# Patient Record
Sex: MALE
[De-identification: names, ages, dates, MRNs, and addresses within clinical notes are randomized per-mention and may not be internally consistent; named-entity substitution may affect disease eponyms.]

---

## 2017-10-22 ENCOUNTER — HOSPITAL ENCOUNTER (INPATIENT)
Dept: HOSPITAL 92 - ERS | Age: 42
LOS: 5 days | Discharge: HOME | DRG: 580 | End: 2017-10-27
Attending: FAMILY MEDICINE | Admitting: FAMILY MEDICINE
Payer: SELF-PAY

## 2017-10-22 VITALS — BODY MASS INDEX: 37 KG/M2

## 2017-10-22 DIAGNOSIS — E66.9: ICD-10-CM

## 2017-10-22 DIAGNOSIS — L02.415: ICD-10-CM

## 2017-10-22 DIAGNOSIS — B95.61: ICD-10-CM

## 2017-10-22 DIAGNOSIS — E87.1: ICD-10-CM

## 2017-10-22 DIAGNOSIS — E11.65: ICD-10-CM

## 2017-10-22 DIAGNOSIS — M70.41: ICD-10-CM

## 2017-10-22 DIAGNOSIS — L03.115: Primary | ICD-10-CM

## 2017-10-22 DIAGNOSIS — R03.0: ICD-10-CM

## 2017-10-22 LAB
ALP SERPL-CCNC: 118 U/L (ref 40–150)
ALT SERPL W P-5'-P-CCNC: 58 U/L (ref 8–55)
ANION GAP SERPL CALC-SCNC: 10 MMOL/L (ref -14–95)
ANION GAP SERPL CALC-SCNC: 14 MMOL/L (ref 10–20)
AST SERPL-CCNC: 32 U/L (ref 5–34)
BASOPHILS # BLD AUTO: 0.1 THOU/UL (ref 0–0.2)
BASOPHILS NFR BLD AUTO: 0.5 % (ref 0–1)
BILIRUB SERPL-MCNC: 0.6 MG/DL (ref 0.2–1.2)
BUN SERPL-MCNC: 12 MG/DL (ref 8.9–20.6)
CALCIUM SERPL-MCNC: 9.5 MG/DL (ref 7.8–10.44)
CHLORIDE SERPL-SCNC: 101 MMOL/L (ref 98–113)
CHLORIDE SERPL-SCNC: 95 MMOL/L (ref 98–107)
CK SERPL-CCNC: 79 U/L (ref 30–200)
CO2 BLDV CALC-SCNC: 25.7 MMOL/L (ref 1–85)
CO2 SERPL-SCNC: 26 MMOL/L (ref 22–29)
CREAT CL PREDICTED SERPL C-G-VRATE: 0 ML/MIN (ref 70–130)
EOSINOPHIL # BLD AUTO: 0.3 THOU/UL (ref 0–0.7)
EOSINOPHIL NFR BLD AUTO: 2.2 % (ref 0–10)
GLOBULIN SER CALC-MCNC: 3.8 G/DL (ref 2.4–3.5)
HCT VFR BLD CALC: 47 % (ref 42–52)
LACTATE SERPL-SCNC: 2.2 MMOL/L (ref 0.5–2.2)
LYMPHOCYTES # BLD: 3.5 THOU/UL (ref 1.2–3.4)
LYMPHOCYTES NFR BLD AUTO: 31 % (ref 21–51)
MONOCYTES # BLD AUTO: 0.8 THOU/UL (ref 0.11–0.59)
MONOCYTES NFR BLD AUTO: 7.5 % (ref 0–10)
NEUTROPHILS # BLD AUTO: 6.7 THOU/UL (ref 1.4–6.5)
RBC # BLD AUTO: 4.74 MILL/UL (ref 4.7–6.1)
SAO2 % BLDV FROM PO2: 97.8 % (ref 0–100)
WBC # BLD AUTO: 11.3 THOU/UL (ref 4.8–10.8)

## 2017-10-22 PROCEDURE — 96372 THER/PROPH/DIAG INJ SC/IM: CPT

## 2017-10-22 PROCEDURE — 80202 ASSAY OF VANCOMYCIN: CPT

## 2017-10-22 PROCEDURE — 86140 C-REACTIVE PROTEIN: CPT

## 2017-10-22 PROCEDURE — 87186 SC STD MICRODIL/AGAR DIL: CPT

## 2017-10-22 PROCEDURE — 87070 CULTURE OTHR SPECIMN AEROBIC: CPT

## 2017-10-22 PROCEDURE — 82550 ASSAY OF CK (CPK): CPT

## 2017-10-22 PROCEDURE — 96375 TX/PRO/DX INJ NEW DRUG ADDON: CPT

## 2017-10-22 PROCEDURE — 36416 COLLJ CAPILLARY BLOOD SPEC: CPT

## 2017-10-22 PROCEDURE — 83930 ASSAY OF BLOOD OSMOLALITY: CPT

## 2017-10-22 PROCEDURE — 82010 KETONE BODYS QUAN: CPT

## 2017-10-22 PROCEDURE — 87205 SMEAR GRAM STAIN: CPT

## 2017-10-22 PROCEDURE — 85007 BL SMEAR W/DIFF WBC COUNT: CPT

## 2017-10-22 PROCEDURE — 85025 COMPLETE CBC W/AUTO DIFF WBC: CPT

## 2017-10-22 PROCEDURE — 83036 HEMOGLOBIN GLYCOSYLATED A1C: CPT

## 2017-10-22 PROCEDURE — 96367 TX/PROPH/DG ADDL SEQ IV INF: CPT

## 2017-10-22 PROCEDURE — 85027 COMPLETE CBC AUTOMATED: CPT

## 2017-10-22 PROCEDURE — 96365 THER/PROPH/DIAG IV INF INIT: CPT

## 2017-10-22 PROCEDURE — 87077 CULTURE AEROBIC IDENTIFY: CPT

## 2017-10-22 PROCEDURE — 83605 ASSAY OF LACTIC ACID: CPT

## 2017-10-22 PROCEDURE — 80053 COMPREHEN METABOLIC PANEL: CPT

## 2017-10-22 PROCEDURE — 82330 ASSAY OF CALCIUM: CPT

## 2017-10-22 PROCEDURE — 85652 RBC SED RATE AUTOMATED: CPT

## 2017-10-22 PROCEDURE — 36415 COLL VENOUS BLD VENIPUNCTURE: CPT

## 2017-10-22 PROCEDURE — 82947 ASSAY GLUCOSE BLOOD QUANT: CPT

## 2017-10-22 PROCEDURE — 87040 BLOOD CULTURE FOR BACTERIA: CPT

## 2017-10-22 PROCEDURE — 82803 BLOOD GASES ANY COMBINATION: CPT

## 2017-10-22 PROCEDURE — 80048 BASIC METABOLIC PNL TOTAL CA: CPT

## 2017-10-22 NOTE — RAD
RIGHT KNEE FOUR VIEWS:

 

History: Knee pain. 

 

FINDINGS: 

There is no sign of fracture, dislocation, or joint effusion. 

 

IMPRESSION: 

Negative right knee. 

 

POS: FAUSTINO

## 2017-10-23 LAB
ANION GAP SERPL CALC-SCNC: 8 MMOL/L (ref 10–20)
BUN SERPL-MCNC: 9 MG/DL (ref 8.9–20.6)
CALCIUM SERPL-MCNC: 8.4 MG/DL (ref 7.8–10.44)
CHLORIDE SERPL-SCNC: 105 MMOL/L (ref 98–107)
CO2 SERPL-SCNC: 27 MMOL/L (ref 22–29)
CREAT CL PREDICTED SERPL C-G-VRATE: 185 ML/MIN (ref 70–130)
HCT VFR BLD CALC: 40.8 % (ref 42–52)
RBC # BLD AUTO: 4.1 MILL/UL (ref 4.7–6.1)
WBC # BLD AUTO: 7.2 THOU/UL (ref 4.8–10.8)

## 2017-10-23 RX ADMIN — INSULIN LISPRO PRN UNIT: 100 INJECTION, SOLUTION INTRAVENOUS; SUBCUTANEOUS at 12:04

## 2017-10-23 RX ADMIN — INSULIN LISPRO PRN UNIT: 100 INJECTION, SOLUTION INTRAVENOUS; SUBCUTANEOUS at 17:56

## 2017-10-23 RX ADMIN — INSULIN LISPRO PRN UNIT: 100 INJECTION, SOLUTION INTRAVENOUS; SUBCUTANEOUS at 05:16

## 2017-10-23 RX ADMIN — CEFTRIAXONE SCH MLS: 2 INJECTION, POWDER, FOR SOLUTION INTRAMUSCULAR; INTRAVENOUS at 21:37

## 2017-10-23 RX ADMIN — VANCOMYCIN HYDROCHLORIDE SCH MLS: 1 INJECTION, POWDER, LYOPHILIZED, FOR SOLUTION INTRAVENOUS at 21:45

## 2017-10-23 RX ADMIN — VANCOMYCIN HYDROCHLORIDE SCH MLS: 1 INJECTION, POWDER, LYOPHILIZED, FOR SOLUTION INTRAVENOUS at 10:07

## 2017-10-23 NOTE — HP
DATE OF ADMISSION:  10/22/2017

 

PRIMARY CARE PHYSICIAN:  City call.

 

CHIEF COMPLAINT:  Right knee pain.

 

HISTORY OF PRESENT ILLNESS:  This is a 42-year-old  male who presents to [facility] complaining of 4 to 5-day history of increasing right knee pain, swelling, redness wi
th difficulty moving the knee.  The patient states he was changing a tire approximately 4 to 5 days 
prior to this evaluation, kneeling on his right knee when a thorn punctured over his right kneecap. 
 The patient states he had increasing swelling, pain, and redness moving up his right inner thigh in
 the last 24 to 48 hours.  The patient states he clean the area with soap and water; however, did no
t notice any foreign body or drainage from the knee.  The patient denies any prior similar incidents
 in the past.  The patient did not take any other specific medications and denied documented or subj
ective fever.  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  In the emergency room, the patient underwent general evaluation with incr
eased redness of the right knee noted undergoing plain radiographic evaluation showing no evidence o
f foreign body.  Screening metabolic survey was also performed showing evidence of undiagnosed diabe
anny mellitus with a glucose of 614.  The patient received intravenous fluids as well as 10 units of 
Novolin R and Zofran.  The patient also received IV vancomycin and Rocephin after suspicion for loca
l cellulitis versus septic bursitis.  The patient was transferred to the Hospitalist Service for winston
luation and admission.

 

PAST MEDICAL HISTORY:  Diabetes mellitus type 2, untreated.

 

PAST SURGICAL HISTORY:  Reviewed and negative.

 

CURRENT MEDICATIONS:  Reviewed and negative.

 

ALLERGIES:  No known drug allergies.

 

FAMILY HISTORY:  The patient admits to one uncle with diabetes mellitus.

 

SOCIAL HISTORY:  The patient has resides in the [facility] area.  Works as a tractor [employer].  Positive alcohol use.  No tobacco or illicit drug use.

 

REVIEW OF SYSTEMS:  The following complete review of systems was negative, unless otherwise mentione
d in the HPI or below:

Constitutional:  Weight loss or gain, ability to conduct usual activities.

Skin:  Rash, itching.

Eyes:  Double vision, pain.

ENT/Mouth:  Nose bleeding, neck stiffness, pain, tenderness.

Cardiovascular:  Palpitations, dyspnea on exertion, orthopnea.

Respiratory:  Shortness of breath, wheezing, cough, hemoptysis, fever or night sweats.

Gastrointestinal:  Poor appetite, abdominal pain, heartburn, nausea, vomiting, constipation, or diar
stella.

Genitourinary:  Urgency, frequency, dysuria, nocturia.

Musculoskeletal:  Pain, swelling.

Neurologic/Psychiatric:  Anxiety, depression.

Allergy/Immunologic:  Skin rash, bleeding tendency.

 

Otherwise, negative except as stated per HPI.

 

PHYSICAL EXAMINATION:

VITAL SIGNS:  On admission, blood pressure 155/95, pulse 94, respiratory rate 18, temperature 98.3 d
egrees Fahrenheit, O2 saturation 98% on room air.

GENERAL APPEARANCE:  This is a 42-year-old  male, alert and oriented x3, pleasant, in no acu
te distress.

HEENT:  Pupils are equal, round, and reactive to light and accommodation.  Extraocular muscles are i
ntact.  No scleral icterus, no conjunctival injection.  Nares patent.  OP is clear.  Teeth in fair r
epair.

NECK:  Supple, no cervical adenopathy, no thyromegaly, no carotid bruits, no JVD appreciated.  Cervi
major spine with full active and passive range of motion.

CHEST:  Lungs are clear to auscultation bilaterally.

CARDIOVASCULAR:  S1, S2, without noted murmur.

ABDOMEN:  Obese, soft, nontender, nondistended.  Bowel sounds are positive in all four quadrants.  T
here is no hepatosplenomegaly, no abdominal bruits, no rebound or guarding appreciated.

EXTREMITIES:  Warm and dry with fair turgor.  Positive edema and erythema over the right patella wit
h partial fluctuance to palpation.  No expressible discharge noted.  Limited active range of motion 
and terminal degrees of flexion.  No popliteal space edema or tenderness to palpation.  Pulses are p
alpable distally at the dorsalis pedis, posterior tibial, and popliteal arteries bilaterally.  Capil
leatha refill less than 2 seconds.

NEUROLOGIC:  Cranial nerves II-XII are grossly intact.  No focal or lateralizing signs appreciated.

 

PERTINENT LABORATORY DATA AND X-RAY FINDINGS:  Sodium 131, potassium 4.2, chloride 95, CO2 of 26, BU
N 12, creatinine 1.03 with estimated GFR of 80, glucose 614, lactic acid level 2.2, serum osmolality
 302, calcium 9.5, total bilirubin 0.6, AST 32, ALT of 58, alkaline phosphatase 118.  CRP 5.61.  Tot
al CK of 79, albumin 4.1.  CBC showed a white blood cell count of 11.3, hemoglobin 15.8, hematocrit 
47, MCV 99.1, platelet count 205 with normal differential.  ESR 14.  Beta hydroxybutyrate level 0.40
.  Four views of the right knee dated 10/22/2017 showed no evidence of acute process.

 

ASSESSMENT AND PLAN:

1.  Right knee cellulitis/questionable septic bursitis.  The patient will be admitted to the medical
 floor.  We will continue Rocephin 2 grams IV q.24 hours with additional vancomycin 1 gram IV q.12 h
ours. We will consult Orthopedic Surgery Service for evaluation and consideration for incision and d
rainage.  Repeat CBC in the a.m.  Continue pain control with Toradol 30 mg IV q.6 hours.

2.  New onset diabetes mellitus type 2, uncontrolled.  We will initiate insulin sliding scale with H
umalog.  Initiate metformin 850 mg p.o. b.i.d.  Check A1c level in the a.m.  ADA diet.  Serial Accu-
Cheks before meals and at bedtime.

3.  Hyponatremia secondary to hyperglycemia.  We will continue normal saline at 125 mL per hour and 
repeat sodium level in the a.m.

4.  Elevated blood pressure.  No specific diagnosis of prior hypertension.  We will continue serial 
blood pressure monitoring and treat as clinically indicated.

5.  Leukocytosis secondary to #1.  Repeat CBC in the a.m.

6.  Prophylaxis.  Update tetanus prior to discharge.  Pepcid 20 mg p.o. b.i.d.  Sequential compressi
on devices while in bed.

7.  Code status is FULL.  Surrogate medical decision maker is patient's son.

## 2017-10-23 NOTE — CON
DATE OF CONSULTATION:  10/23/2017

 

ORTHOPEDIC CONSULTATION

 

REQUESTING PHYSICIAN:  Jaz Beltran M.D.

 

CONSULTING PHYSICIAN:  Samir Arnold M.D.

 

REASON FOR CONSULTATION:  Right knee pain, swelling, and redness consistent with suprapatellar bursi
tis and septic bursitis.

 

BRIEF CLINICAL HISTORY:  Jake is a 42-year-old  male who was admitted to the emergency ro
om by the medicine team after he presented with increasing pain in the right knee for 4 to 5 days.  
He had no evaluation and no treatment, but apparently a thorn punctured his skin over his right knee
cap.  Pain has been getting worse, but he has had no drainage and the erythema has been becoming mor
e noticeable.  Apparently, he presented with glucose of 614.  He has been started on IV vancomycin a
nd Rocephin.  Our service was consulted for evaluation for septic bursitis and surgical candidacy fo
r possible drainage.

 

PAST MEDICAL HISTORY:  New diagnosis, diabetes type 2, untreated.

 

PAST SURGICAL HISTORY:  Negative.

 

MEDICATIONS:  None.

 

ALLERGIES:  No known drug allergies.  Denies any contact allergies.

 

SOCIAL HISTORY:  He is a labor, in particular a .  Consumes ethanol socially.  No to
bacco or illicit drug abuse.

 

PHYSICAL EXAMINATION:  Visual inspection of the right lower extremity demonstrates him to have swell
ing in the supralateral aspect of the right knee.  Patellar ballottement is negative.  There is no f
luctuance, but it is quite indurated, tender, warm, and erythematous, but the joint does not appear 
to be involved.  He has active range of motion of the knee and passive is non-provocative.  No evide
nce of trauma was noted, but he does have some induration of about 3 to 4 cm in length in the transv
erse plane.  No fluctuance is noted.  No purulence is expressed.  Erythema surrounds this and is ten
job and warm and local and blanches with pressure.

 

IMPRESSION:  Right knee extracapsular suprapatellar cellulitis.

 

PLAN:  We will observe for the next 24 hours.  We will let him receive IV antibiotics to see if the 
swelling and induration goes down, but currently I cannot detect a fluctuant abscess appropriate for
 drainage.  The knee capsule does not appear to be involved, but certainly proceed with IV antibioti
c treatment.  Reevaluate tomorrow morning and I will keep him n.p.o. just in case.

## 2017-10-23 NOTE — PDOC.PN
- Subjective


Encounter Start Date: 10/23/17


Encounter Start Time: 12:00


Subjective: right knee is feeling better





- Objective


Resuscitation Status: 


 











Resuscitation Status           FULL:Full Resuscitation














MAR Reviewed: Yes


Vital Signs & Weight: 


 Vital Signs (12 hours)











  Temp Pulse Resp BP BP Pulse Ox


 


 10/23/17 12:00  97.6 F  75  18  123/77   95


 


 10/23/17 08:00  97.9 F  74  18  111/73   98


 


 10/23/17 04:00  97.9 F  77  16   119/80  98








 Weight











Weight                         223 lb














I&O: 


 











 10/22/17 10/23/17 10/24/17





 06:59 06:59 06:59


 


Intake Total  1275 240


 


Balance  1275 240











Result Diagrams: 


 10/23/17 05:09





 10/23/17 05:09


Additional Labs: 


 Accuchecks











  10/23/17 10/23/17 10/22/17





  11:57 04:57 23:17


 


POC Glucose  214 H  284 H  277 H














Phys Exam





- Physical Examination


HEENT: PERRLA, moist MMs


Neck: no JVD, supple


Respiratory: no wheezing, no rales


Cardiovascular: RRR, no significant murmur


Gastrointestinal: soft, no distention, positive bowel sounds


Musculoskeletal: pulses present


right knee has edema, erythema and crepitus


Neurological: non-focal, moves all 4 limbs


Psychiatric: A&O x 3





Dx/Plan


(1) Cellulitis of right knee


Code(s): L03.115 - CELLULITIS OF RIGHT LOWER LIMB   Status: Acute   





(2) DM type 2 (diabetes mellitus, type 2)


Status: Acute   


Qualifiers: 


   Diabetes mellitus complication status: with hyperglycemia   Diabetes 

mellitus long term insulin use: without long term use   Qualified Code(s): 

E11.65 - Type 2 diabetes mellitus with hyperglycemia   


Comment: new onset   





(3) Obesity (BMI 30-39.9)


Code(s): E66.9 - OBESITY, UNSPECIFIED   Status: Chronic   





- Plan


is on vanc and ceftriaxone


-: diabetic education, new onset dm


-: is on metformin 850mg bid with coverage


-:  will revisit patient in am to see response for antibiotics





* .








Review of Systems





- Medications/Allergies


Allergies/Adverse Reactions: 


 Allergies











Allergy/AdvReac Type Severity Reaction Status Date / Time


 


No Known Drug Allergies Allergy   Verified 10/23/17 00:39











Medications: 


 Current Medications





Acetaminophen (Tylenol)  1,000 mg PO Q6H PRN


   PRN Reason: Headache/Fever or Mild Pain


Hydrocodone Bitart/Acetaminophen (Norco 5/325)  1 tab PO Q4H PRN


   PRN Reason: Moderate Pain (4-6)


Clonidine HCl (Catapres)  0.1 mg PO Q4H PRN


   PRN Reason: Systolic BP > 180


Dextrose/Water (Dextrose 50%)  25 gm SLOW IVP PRN PRN


   PRN Reason: Hypoglycemia


Famotidine (Pepcid)  20 mg PO BID [facility]


   Last Admin: 10/23/17 08:07 Dose:  20 mg


Glucagon (Glucagon)  1 mg IM PRN PRN


   PRN Reason: Hypoglycemia


Hydralazine HCl (Apresoline)  10 mg SLOW IVP Q4H PRN


   PRN Reason: Systolic BP > 180


Dextrose/Water (D5w)  1,000 mls @ 0 mls/hr IV .Q0M PRN; As Directed


   PRN Reason: Hypoglycemia


Sodium Chloride (Normal Saline 0.9%)  1,000 mls @ 125 mls/hr IV .Q8H [facility]


   Last Admin: 10/23/17 08:10 Dose:  1,000 mls


Vancomycin HCl 1.5 gm/ Sodium (Chloride)  250 mls @ 166.67 mls/hr IVPB 1000,

2200 [facility]


   Last Admin: 10/23/17 10:07 Dose:  250 mls


Ceftriaxone Sodium 2 gm/Miscellaneous Medication 1 each/ Sodium Chloride  100 

mls @ 200 mls/hr IVPB Q24HR [facility]


Insulin Human Lispro (Humalog)  0 units SC .MODERATE SLIDING SC PRN


   PRN Reason: Moderate Correctional Scale


   Last Admin: 10/23/17 12:04 Dose:  4 unit


Insulin Human Lispro (Humalog)  0 units SC .BEDTIME SLIDING SC PRN


   PRN Reason: Bedtime Correctional Scale


Ketorolac Tromethamine (Toradol)  30 mg IVP Q6HR [facility]


   Stop: 10/27/17 23:59


   Last Admin: 10/23/17 12:03 Dose:  30 mg


Metformin HCl (Glucophage)  850 mg PO BID-[facility]


   Last Admin: 10/23/17 08:07 Dose:  850 mg


Ondansetron HCl (Zofran Odt)  4 mg PO Q6H PRN


   PRN Reason: Nausea/Vomiting


Ondansetron HCl (Zofran)  4 mg IVP Q6H PRN


   PRN Reason: Nausea/Vomiting

## 2017-10-24 LAB — VANCOMYCIN TROUGH SERPL-MCNC: 5.7 UG/ML

## 2017-10-24 RX ADMIN — CEFTRIAXONE SCH MLS: 2 INJECTION, POWDER, FOR SOLUTION INTRAMUSCULAR; INTRAVENOUS at 21:04

## 2017-10-24 RX ADMIN — INSULIN LISPRO PRN UNIT: 100 INJECTION, SOLUTION INTRAVENOUS; SUBCUTANEOUS at 12:19

## 2017-10-24 RX ADMIN — VANCOMYCIN HYDROCHLORIDE SCH MLS: 1 INJECTION, POWDER, LYOPHILIZED, FOR SOLUTION INTRAVENOUS at 21:41

## 2017-10-24 RX ADMIN — INSULIN LISPRO PRN UNIT: 100 INJECTION, SOLUTION INTRAVENOUS; SUBCUTANEOUS at 17:05

## 2017-10-24 RX ADMIN — VANCOMYCIN HYDROCHLORIDE SCH MLS: 1 INJECTION, POWDER, LYOPHILIZED, FOR SOLUTION INTRAVENOUS at 09:08

## 2017-10-24 RX ADMIN — HYDROCODONE BITARTRATE AND ACETAMINOPHEN PRN TAB: 5; 325 TABLET ORAL at 16:44

## 2017-10-24 RX ADMIN — HYDROCODONE BITARTRATE AND ACETAMINOPHEN PRN TAB: 5; 325 TABLET ORAL at 09:07

## 2017-10-24 NOTE — PDOC.PN
- Subjective


Encounter Start Date: 10/24/17


Encounter Start Time: 11:00


Subjective: feels better, right knee pain and swelling is better





- Objective


Resuscitation Status: 


 











Resuscitation Status           FULL:Full Resuscitation














MAR Reviewed: Yes


Vital Signs & Weight: 


 Vital Signs (12 hours)











  Temp Pulse Resp BP Pulse Ox


 


 10/24/17 07:54  98.1 F  72  16  129/86  97








 Weight











Weight                         223 lb














I&O: 


 











 10/23/17 10/24/17 10/25/17





 06:59 06:59 06:59


 


Intake Total 1275 480 


 


Balance 1275 480 











Result Diagrams: 


 10/23/17 05:09





 10/23/17 05:09


Additional Labs: 


 Accuchecks











  10/24/17 10/23/17 10/23/17





  04:13 20:23 16:12


 


POC Glucose  246 H  267 H  280 H














  10/23/17





  11:57


 


POC Glucose  214 H














Phys Exam





- Physical Examination


HEENT: PERRLA, moist MMs


Neck: no JVD, supple


Respiratory: no wheezing, no rales


Cardiovascular: RRR, no significant murmur


Gastrointestinal: soft, non-tender, positive bowel sounds


Musculoskeletal: pulses present


right knee erythema and induration is better


Neurological: non-focal, moves all 4 limbs


Psychiatric: A&O x 3





Dx/Plan


(1) Cellulitis of right knee


Code(s): L03.115 - CELLULITIS OF RIGHT LOWER LIMB   Status: Acute   





(2) DM type 2 (diabetes mellitus, type 2)


Status: Acute   


Qualifiers: 


   Diabetes mellitus complication status: with hyperglycemia   Diabetes 

mellitus long term insulin use: without long term use   Qualified Code(s): 

E11.65 - Type 2 diabetes mellitus with hyperglycemia   


Comment: new onset   





(3) Obesity (BMI 30-39.9)


Code(s): E66.9 - OBESITY, UNSPECIFIED   Status: Chronic   





- Plan


is on vanc and ceftriaxone


-: add glipizide


-: will switch metformin to tid 500mg in am


-: ortho team is following





* .








Review of Systems





- Medications/Allergies


Allergies/Adverse Reactions: 


 Allergies











Allergy/AdvReac Type Severity Reaction Status Date / Time


 


No Known Drug Allergies Allergy   Verified 10/23/17 00:39











Medications: 


 Current Medications





Acetaminophen (Tylenol)  1,000 mg PO Q6H PRN


   PRN Reason: Headache/Fever or Mild Pain


Hydrocodone Bitart/Acetaminophen (Norco 5/325)  1 tab PO Q4H PRN


   PRN Reason: Moderate Pain (4-6)


   Last Admin: 10/24/17 09:07 Dose:  1 tab


Clonidine HCl (Catapres)  0.1 mg PO Q4H PRN


   PRN Reason: Systolic BP > 180


Dextrose/Water (Dextrose 50%)  25 gm SLOW IVP PRN PRN


   PRN Reason: Hypoglycemia


Famotidine (Pepcid)  20 mg PO BID [facility]


   Last Admin: 10/24/17 09:08 Dose:  20 mg


Glipizide (Glucotrol)  5 mg PO ONE [facility]


   Stop: 10/24/17 10:59


Glipizide (Glucotrol)  5 mg PO DAILY-[facility]


Glucagon (Glucagon)  1 mg IM PRN PRN


   PRN Reason: Hypoglycemia


Hydralazine HCl (Apresoline)  10 mg SLOW IVP Q4H PRN


   PRN Reason: Systolic BP > 180


Dextrose/Water (D5w)  1,000 mls @ 0 mls/hr IV .Q0M PRN; As Directed


   PRN Reason: Hypoglycemia


Vancomycin HCl 1.5 gm/ Sodium (Chloride)  250 mls @ 166.67 mls/hr IVPB 1000,

2200 [facility]


   Last Admin: 10/24/17 09:08 Dose:  250 mls


Ceftriaxone Sodium 2 gm/Miscellaneous Medication 1 each/ Sodium Chloride  100 

mls @ 200 mls/hr IVPB Q24HR [facility]


   Last Admin: 10/23/17 21:37 Dose:  100 mls


Insulin Human Lispro (Humalog)  0 units SC .MODERATE SLIDING SC PRN


   PRN Reason: Moderate Correctional Scale


   Last Admin: 10/23/17 17:56 Dose:  6 unit


Insulin Human Lispro (Humalog)  0 units SC .BEDTIME SLIDING SC PRN


   PRN Reason: Bedtime Correctional Scale


   Last Admin: 10/23/17 21:36 Dose:  3 unit


Ketorolac Tromethamine (Toradol)  30 mg IVP Q6HR [facility]


   Stop: 10/27/17 23:59


   Last Admin: 10/24/17 05:15 Dose:  30 mg


Metformin HCl (Glucophage)  850 mg PO BID-[facility]


   Last Admin: 10/24/17 09:08 Dose:  850 mg


Ondansetron HCl (Zofran Odt)  4 mg PO Q6H PRN


   PRN Reason: Nausea/Vomiting


Ondansetron HCl (Zofran)  4 mg IVP Q6H PRN


   PRN Reason: Nausea/Vomiting

## 2017-10-24 NOTE — PRG
DATE OF SERVICE:  10/24/2017

 

SUBJECTIVE:  Андрей's knee feels better with the antibiotic treatment.  He is receiving Rocephin IV; 
this has had a significant impact as well as the vancomycin.

 

OBJECTIVE:

VITAL SIGNS:  Temperature 98.1, pulse 72, respiratory rate 16, O2 saturation 97%, and blood pressure
 129/86.

GENERAL:  He is alert and oriented to person, place, time, and situation, and appropriate.  He has v
brian little medical complaints.  He has a full range of motion of the knee.  His erythema is receding
, but he still has a little bit of firmness and induration of the skin, but again no significant flu
ctuance or abscess is palpable.  No drainage.

 

IMPRESSION:  Right knee suprapatellar cellulitis with suspected small abscess, but cannot clinically
 localize it.

 

PLAN:  We are going to let him eat today and make him n.p.o. after midnight tomorrow, but most likel
y an I\T\D at the bedside, tomorrow should we be able to localize an abscess.

## 2017-10-25 LAB
ANION GAP SERPL CALC-SCNC: 12 MMOL/L (ref 10–20)
BASOPHILS # BLD AUTO: 0.1 THOU/UL (ref 0–0.2)
BASOPHILS NFR BLD AUTO: 0.9 % (ref 0–1)
BUN SERPL-MCNC: 8 MG/DL (ref 8.9–20.6)
CALCIUM SERPL-MCNC: 8.5 MG/DL (ref 7.8–10.44)
CHLORIDE SERPL-SCNC: 104 MMOL/L (ref 98–107)
CO2 SERPL-SCNC: 23 MMOL/L (ref 22–29)
CREAT CL PREDICTED SERPL C-G-VRATE: 196 ML/MIN (ref 70–130)
EOSINOPHIL # BLD AUTO: 0.3 THOU/UL (ref 0–0.7)
EOSINOPHIL NFR BLD AUTO: 4.7 % (ref 0–10)
HCT VFR BLD CALC: 42.8 % (ref 42–52)
LYMPHOCYTES # BLD: 2.1 THOU/UL (ref 1.2–3.4)
LYMPHOCYTES NFR BLD AUTO: 31.4 % (ref 21–51)
MONOCYTES # BLD AUTO: 0.7 THOU/UL (ref 0.11–0.59)
MONOCYTES NFR BLD AUTO: 10.5 % (ref 0–10)
NEUTROPHILS # BLD AUTO: 3.6 THOU/UL (ref 1.4–6.5)
RBC # BLD AUTO: 4.43 MILL/UL (ref 4.7–6.1)
VANCOMYCIN TROUGH SERPL-MCNC: 12.3 UG/ML
WBC # BLD AUTO: 6.8 THOU/UL (ref 4.8–10.8)

## 2017-10-25 PROCEDURE — 0J9N0ZZ DRAINAGE OF RIGHT LOWER LEG SUBCUTANEOUS TISSUE AND FASCIA, OPEN APPROACH: ICD-10-PCS | Performed by: PHYSICIAN ASSISTANT

## 2017-10-25 RX ADMIN — INSULIN LISPRO PRN UNIT: 100 INJECTION, SOLUTION INTRAVENOUS; SUBCUTANEOUS at 17:02

## 2017-10-25 RX ADMIN — VANCOMYCIN HYDROCHLORIDE SCH MLS: 1 INJECTION, POWDER, LYOPHILIZED, FOR SOLUTION INTRAVENOUS at 05:18

## 2017-10-25 RX ADMIN — VANCOMYCIN HYDROCHLORIDE SCH MLS: 1 INJECTION, POWDER, LYOPHILIZED, FOR SOLUTION INTRAVENOUS at 22:15

## 2017-10-25 RX ADMIN — CEFTRIAXONE SCH MLS: 2 INJECTION, POWDER, FOR SOLUTION INTRAMUSCULAR; INTRAVENOUS at 21:16

## 2017-10-25 RX ADMIN — VANCOMYCIN HYDROCHLORIDE SCH MLS: 1 INJECTION, POWDER, LYOPHILIZED, FOR SOLUTION INTRAVENOUS at 14:12

## 2017-10-25 RX ADMIN — HYDROCODONE BITARTRATE AND ACETAMINOPHEN PRN TAB: 5; 325 TABLET ORAL at 21:19

## 2017-10-25 NOTE — OP
DATE OF PROCEDURE:  10/25/2017

 

PREPROCEDURE DIAGNOSIS:  Right knee cellulitis secondary to suprapatellar abscess.

 

POSTPROCEDURE DIAGNOSIS:  Right knee cellulitis secondary to suprapatellar abscess.

 

OPERATIVE PROCEDURE:  Incision, drainage, washout, exploration, and primary packing of right knee monk
prapatellar septic abscess.

 

SURGEON:  Chon Nino PA-C.

 

ANESTHESIA:  100 mcg fentanyl IV augmented with local infiltration of Xylocaine 1%.

 

FINDINGS:  A 2 x 4 cm abscess in the subcutaneous and subfascial layer, but extracapsular in nature,
 depth of about 2 cm.  Purulent drainage consistent grossly with Staph aureus.

 

DRAINS:  None.

 

SPECIMENS:  Culturette for aerobic and anaerobic was obtained.

 

COMPLICATIONS:  None.

 

COUNTS:  Correct.

 

INDICATIONS FOR SURGERY:  Андрей is a 42-year-old  male, who was admitted, 3 days ago, for ri
ght knee cellulitis.  He had vaguely palpable abscess, but it was felt that initial treatment with I
V antibiotics would make significant progress as it has.  Therefore, we elected to proceed with a be
dside incision, drainage, exploration, and washout due to the nature and location of this particular
 abscess.  His knee joint has not been affected clinically nor has he had any constitutional symptom
s.  Therefore, the patient has elected to proceed with superficial bedside incision, drainage, explo
ration, washout, and primary packing.

 

PROCEDURE IN DETAIL:  After informed consent was obtained, the patient was positioned appropriately.
  He did receive preprocedure diagnosis.  The right lower extremity was then prepped and draped in t
he usual sterile fashion.  A 1% of skin wheal anesthesia was used to anesthetize the local incision 
site with about 5 mL of lidocaine.  He also received 100 mcg of fentanyl preoperatively.  After this
 was completed, a transverse incision was made then over the length and breadth of the abscess follo
wing the curvilinear shape of it.  We excised down into subcutaneous layer, which was about 1 cm and
 then encountered a purulent pocket of what appeared to be staph abscess.  This was then further exp
lored with blunt dissection and the cavity measured about 2 x 4 cm and 1 cm in depth.  The cavity wa
s then explored with digital probing and culturette was obtained.  We then irrigated the cavity, com
pleted with normal saline, and with Kittner probing.  Once this was completed, we then packed with q
uarter-inch iodoform gauze making sure there are no loculations and good tight compressed fit circum
ferentially.  After fully packing the depth of the cavity, we then irrigated again with Xylocaine.  
Sterile dressing was applied.  The procedure was terminated without any complications.  The patient 
tolerated really well.  Gram stain culture and sensitivity will be sent.  We will see the patient christiano gallardo tomorrow.

## 2017-10-25 NOTE — PDOC.PN
- Subjective


Encounter Start Date: 10/25/17


Encounter Start Time: 09:45


-: old records requested/rev





Patient seen and examined. No new complaints. No overnight events





- Objective


Resuscitation Status: 


 











Resuscitation Status           FULL:Full Resuscitation














MAR Reviewed: Yes


Vital Signs & Weight: 


 Vital Signs (12 hours)











  Temp Pulse Resp BP Pulse Ox


 


 10/25/17 08:00  98.1 F  70  16  122/71  97








 Weight











Weight                         223 lb














I&O: 


 











 10/24/17 10/25/17 10/26/17





 06:59 06:59 06:59


 


Intake Total 480 1250 480


 


Balance 480 1250 480











Result Diagrams: 


 10/25/17 05:07





 10/25/17 05:07


Additional Labs: 


 Accuchecks











  10/25/17 10/25/17 10/24/17





  10:52 05:17 20:13


 


POC Glucose  231 H  230 H  179 H














  10/24/17 10/24/17





  16:57 12:07


 


POC Glucose  176 H  230 H














Phys Exam





- Physical Examination


Constitutional: NAD


HEENT: PERRLA, moist MMs, sclera anicteric


Neck: no JVD, supple


Respiratory: no wheezing, no rales, no rhonchi


Cardiovascular: RRR, no significant murmur, no rub


Gastrointestinal: soft, non-tender, no distention, positive bowel sounds


Musculoskeletal: no edema


knee swelling on right


Neurological: non-focal, normal sensation, moves all 4 limbs


Psychiatric: normal affect, A&O x 3


Skin: no rash, normal turgor





Dx/Plan


(1) Cellulitis of right knee


Code(s): L03.115 - CELLULITIS OF RIGHT LOWER LIMB   Status: Acute   





(2) DM type 2 (diabetes mellitus, type 2)


Status: Acute   


Qualifiers: 


   Diabetes mellitus complication status: with hyperglycemia   Diabetes 

mellitus long term insulin use: without long term use   Qualified Code(s): 

E11.65 - Type 2 diabetes mellitus with hyperglycemia   


Comment: new onset   





(3) Obesity (BMI 30-39.9)


Code(s): E66.9 - OBESITY, UNSPECIFIED   Status: Chronic   





(4) Prepatellar bursitis


Code(s): M70.40 - PREPATELLAR BURSITIS, UNSPECIFIED KNEE   Status: Acute   


Qualifiers: 


   Laterality: right   Qualified Code(s): M70.41 - Prepatellar bursitis, right 

knee   





- Plan


cont current plan of care, plan discussed w/ family, continue antibiotics





* today pt will need I & D


* continue IV vancomycin and rocephin


* pain control


* medication reviewed as below


* symptomatic treatment.








Review of Systems





- Review of Systems


ENT: negative: Ear Pain, Ear Discharge, Nose Pain, Nose Discharge, Nose 

Congestion, Mouth Pain, Mouth Swelling, Throat Pain, Throat Swelling, Other


Respiratory: negative: Cough, Dry, Shortness of Breath, Hemoptysis, SOB with 

Excertion, Pleuritic Pain, Sputum, Wheezing


Cardiovascular: negative: Chest Pain, Palpitations, Orthopnea, Paroxysmal Noc. 

Dyspnea, Edema, Light Headedness, Other


Gastrointestinal: negative: Nausea, Vomiting, Abdominal Pain, Diarrhea, 

Constipation, Melena, Hematochezia, Other


Genitourinary: negative: Dysuria, Frequency, Incontinence, Hematuria, Retention

, Other


Musculoskeletal: negative: Neck Pain, Shoulder Pain, Arm Pain, Back Pain, Hand 

Pain, Leg Pain, Foot Pain, Other


Skin: negative: Rash, Lesions, Melquiades, Bruising, Other





- Medications/Allergies


Allergies/Adverse Reactions: 


 Allergies











Allergy/AdvReac Type Severity Reaction Status Date / Time


 


No Known Drug Allergies Allergy   Verified 10/23/17 00:39











Medications: 


 Current Medications





Acetaminophen (Tylenol)  1,000 mg PO Q6H PRN


   PRN Reason: Headache/Fever or Mild Pain


Hydrocodone Bitart/Acetaminophen (Norco 5/325)  1 tab PO Q4H PRN


   PRN Reason: Moderate Pain (4-6)


   Last Admin: 10/24/17 16:44 Dose:  1 tab


Clonidine HCl (Catapres)  0.1 mg PO Q4H PRN


   PRN Reason: Systolic BP > 180


Dextrose/Water (Dextrose 50%)  25 gm SLOW IVP PRN PRN


   PRN Reason: Hypoglycemia


Famotidine (Pepcid)  20 mg PO BID [facility]


   Last Admin: 10/25/17 11:32 Dose:  Not Given


Glipizide (Glucotrol)  5 mg PO DAILY-[facility]


   Last Admin: 10/25/17 07:49 Dose:  5 mg


Glucagon (Glucagon)  1 mg IM PRN PRN


   PRN Reason: Hypoglycemia


Hydralazine HCl (Apresoline)  10 mg SLOW IVP Q4H PRN


   PRN Reason: Systolic BP > 180


Dextrose/Water (D5w)  1,000 mls @ 0 mls/hr IV .Q0M PRN; As Directed


   PRN Reason: Hypoglycemia


Ceftriaxone Sodium 2 gm/Miscellaneous Medication 1 each/ Sodium Chloride  100 

mls @ 200 mls/hr IVPB Q24HR [facility]


   Last Admin: 10/24/17 21:04 Dose:  100 mls


Vancomycin HCl 1.5 gm/ Sodium (Chloride)  250 mls @ 166.67 mls/hr IVPB Q8HR [facility]


   Last Admin: 10/25/17 14:12 Dose:  250 mls


Insulin Human Lispro (Humalog)  0 units SC .MODERATE SLIDING SC PRN


   PRN Reason: Moderate Correctional Scale


   Last Admin: 10/24/17 17:05 Dose:  2 unit


Insulin Human Lispro (Humalog)  0 units SC .BEDTIME SLIDING SC PRN


   PRN Reason: Bedtime Correctional Scale


   Last Admin: 10/23/17 21:36 Dose:  3 unit


Ketorolac Tromethamine (Toradol)  30 mg IVP Q6HR [facility]


   Stop: 10/27/17 23:59


   Last Admin: 10/25/17 11:30 Dose:  30 mg


Metformin HCl (Glucophage)  850 mg PO BID-[facility]


   Last Admin: 10/25/17 07:49 Dose:  850 mg


Ondansetron HCl (Zofran Odt)  4 mg PO Q6H PRN


   PRN Reason: Nausea/Vomiting


Ondansetron HCl (Zofran)  4 mg IVP Q6H PRN


   PRN Reason: Nausea/Vomiting


Sodium Chloride (Flush - Normal Saline)  10 ml IVF PRN PRN


   PRN Reason: Saline Flush

## 2017-10-26 LAB — VANCOMYCIN TROUGH SERPL-MCNC: 11.8 UG/ML

## 2017-10-26 RX ADMIN — CEFTRIAXONE SCH MLS: 2 INJECTION, POWDER, FOR SOLUTION INTRAMUSCULAR; INTRAVENOUS at 21:06

## 2017-10-26 RX ADMIN — VANCOMYCIN HYDROCHLORIDE SCH MLS: 1 INJECTION, POWDER, LYOPHILIZED, FOR SOLUTION INTRAVENOUS at 05:14

## 2017-10-26 RX ADMIN — HYDROCODONE BITARTRATE AND ACETAMINOPHEN PRN TAB: 5; 325 TABLET ORAL at 20:34

## 2017-10-26 RX ADMIN — HYDROCODONE BITARTRATE AND ACETAMINOPHEN PRN TAB: 5; 325 TABLET ORAL at 15:02

## 2017-10-26 RX ADMIN — VANCOMYCIN HYDROCHLORIDE SCH MLS: 1 INJECTION, POWDER, LYOPHILIZED, FOR SOLUTION INTRAVENOUS at 14:20

## 2017-10-26 RX ADMIN — HYDROCODONE BITARTRATE AND ACETAMINOPHEN PRN TAB: 5; 325 TABLET ORAL at 03:55

## 2017-10-26 NOTE — PDOC.PN
- Subjective


Encounter Start Date: 10/26/17


Encounter Start Time: 10:30





Patient seen and examined. No new complaints. No overnight events





- Objective


Resuscitation Status: 


 











Resuscitation Status           FULL:Full Resuscitation














MAR Reviewed: Yes


Vital Signs & Weight: 


 Vital Signs (12 hours)











  Temp Pulse Resp BP Pulse Ox


 


 10/26/17 08:00  97.7 F  82  22 H   96


 


 10/26/17 07:42  97.7 F  82  22 H  136/82  96








 Weight











Weight                         223 lb














I&O: 


 











 10/25/17 10/26/17 10/27/17





 06:59 06:59 06:59


 


Intake Total 4553 120 0905


 


Balance 7241 278 1898











Result Diagrams: 


 10/25/17 05:07





 10/25/17 05:07


Additional Labs: 


 Accuchecks











  10/26/17 10/25/17 10/25/17





  04:40 20:57 16:42


 


POC Glucose  153 H  115 H  420 H














Phys Exam





- Physical Examination


Constitutional: NAD


HEENT: PERRLA, moist MMs, sclera anicteric


Neck: no JVD, supple


Respiratory: no wheezing, no rales, no rhonchi


Cardiovascular: RRR, no significant murmur, no rub


Gastrointestinal: soft, non-tender, no distention, positive bowel sounds


Musculoskeletal: no edema, pulses present


Neurological: non-focal, normal sensation, moves all 4 limbs


Psychiatric: normal affect, A&O x 3


Skin: no rash, normal turgor





Dx/Plan


(1) Cellulitis of right knee


Code(s): L03.115 - CELLULITIS OF RIGHT LOWER LIMB   Status: Acute   





(2) DM type 2 (diabetes mellitus, type 2)


Status: Acute   


Qualifiers: 


   Diabetes mellitus complication status: with hyperglycemia   Diabetes 

mellitus long term insulin use: without long term use   Qualified Code(s): 

E11.65 - Type 2 diabetes mellitus with hyperglycemia   


Comment: new onset   





(3) Obesity (BMI 30-39.9)


Code(s): E66.9 - OBESITY, UNSPECIFIED   Status: Chronic   





(4) Prepatellar bursitis


Code(s): M70.40 - PREPATELLAR BURSITIS, UNSPECIFIED KNEE   Status: Acute   


Qualifiers: 


   Laterality: right   Qualified Code(s): M70.41 - Prepatellar bursitis, right 

knee   





- Plan


cont current plan of care, plan discussed w/ family, continue antibiotics





* continue IV vancomycin and rocephin


* pain controlled


* wound care


* follow up on culture


* ortho following


* medication reviewed as below


* symptomatic treatment.








Review of Systems





- Review of Systems


Constitutional: negative: Fever, Chills, Sweats, Weakness, Malaise, Other


ENT: negative: Ear Pain, Ear Discharge, Nose Pain, Nose Discharge, Nose 

Congestion, Mouth Pain, Mouth Swelling, Throat Pain, Throat Swelling, Other


Respiratory: negative: Cough, Dry, Shortness of Breath, Hemoptysis, SOB with 

Excertion, Pleuritic Pain, Sputum, Wheezing


Cardiovascular: negative: Chest Pain, Palpitations, Orthopnea, Paroxysmal Noc. 

Dyspnea, Edema, Light Headedness, Other


Gastrointestinal: negative: Nausea, Vomiting, Abdominal Pain, Diarrhea, 

Constipation, Melena, Hematochezia, Other


Genitourinary: negative: Dysuria, Frequency, Incontinence, Hematuria, Retention

, Other


Musculoskeletal: negative: Neck Pain, Shoulder Pain, Arm Pain, Back Pain, Hand 

Pain, Leg Pain, Foot Pain, Other


Skin: negative: Rash, Lesions, Melquiades, Bruising, Other





- Medications/Allergies


Allergies/Adverse Reactions: 


 Allergies











Allergy/AdvReac Type Severity Reaction Status Date / Time


 


No Known Drug Allergies Allergy   Verified 10/23/17 00:39











Medications: 


 Current Medications





Acetaminophen (Tylenol)  1,000 mg PO Q6H PRN


   PRN Reason: Headache/Fever or Mild Pain


Hydrocodone Bitart/Acetaminophen (Norco 5/325)  1 tab PO Q4H PRN


   PRN Reason: Moderate Pain (4-6)


   Last Admin: 10/26/17 03:55 Dose:  1 tab


Clonidine HCl (Catapres)  0.1 mg PO Q4H PRN


   PRN Reason: Systolic BP > 180


Dextrose/Water (Dextrose 50%)  25 gm SLOW IVP PRN PRN


   PRN Reason: Hypoglycemia


Famotidine (Pepcid)  20 mg PO BID [facility]


   Last Admin: 10/26/17 07:55 Dose:  20 mg


Glipizide (Glucotrol)  5 mg PO DAILY-[facility]


   Last Admin: 10/26/17 07:55 Dose:  5 mg


Glucagon (Glucagon)  1 mg IM PRN PRN


   PRN Reason: Hypoglycemia


Hydralazine HCl (Apresoline)  10 mg SLOW IVP Q4H PRN


   PRN Reason: Systolic BP > 180


Dextrose/Water (D5w)  1,000 mls @ 0 mls/hr IV .Q0M PRN; As Directed


   PRN Reason: Hypoglycemia


Ceftriaxone Sodium 2 gm/Miscellaneous Medication 1 each/ Sodium Chloride  100 

mls @ 200 mls/hr IVPB Q24HR [facility]


   Last Admin: 10/25/17 21:16 Dose:  100 mls


Vancomycin HCl 1.5 gm/ Sodium (Chloride)  250 mls @ 166.67 mls/hr IVPB Q8HR [facility]


   Last Admin: 10/26/17 05:14 Dose:  250 mls


Insulin Human Lispro (Humalog)  0 units SC .MODERATE SLIDING SC PRN


   PRN Reason: Moderate Correctional Scale


   Last Admin: 10/25/17 17:02 Dose:  10 unit


Insulin Human Lispro (Humalog)  0 units SC .BEDTIME SLIDING SC PRN


   PRN Reason: Bedtime Correctional Scale


   Last Admin: 10/23/17 21:36 Dose:  3 unit


Ketorolac Tromethamine (Toradol)  30 mg IVP Q6HR [facility]


   Stop: 10/27/17 23:59


   Last Admin: 10/26/17 11:59 Dose:  30 mg


Metformin HCl (Glucophage)  850 mg PO BID-[facility]


   Last Admin: 10/26/17 10:18 Dose:  850 mg


Ondansetron HCl (Zofran Odt)  4 mg PO Q6H PRN


   PRN Reason: Nausea/Vomiting


Ondansetron HCl (Zofran)  4 mg IVP Q6H PRN


   PRN Reason: Nausea/Vomiting


Sodium Chloride (Flush - Normal Saline)  10 ml IVF PRN PRN


   PRN Reason: Saline Flush

## 2017-10-27 VITALS — TEMPERATURE: 97.6 F | DIASTOLIC BLOOD PRESSURE: 83 MMHG | SYSTOLIC BLOOD PRESSURE: 113 MMHG

## 2017-10-27 NOTE — DIS
DATE OF ADMISSION:  10/22/2017

 

DATE OF DISCHARGE:  10/27/2017

 

PRIMARY CARE PHYSICIAN:  [facility] call admission.

 

DISCHARGE DISPOSITION:  Home.

 

PRIMARY DISCHARGE DIAGNOSIS:  Right knee cellulitis with right knee prepatellar 
bursitis, status post incision and drainage.

 

SECONDARY DISCHARGE DIAGNOSES:  Diabetes type 2, obesity with body mass index 
of 37.

 

PRIMARY PROCEDURE/OPERATION:  Incision and debridement of prepatellar bursitis 
by Dr. Arnold.

 

RADIOLOGICAL INVESTIGATION:  Knee x-ray.

 

SIGNIFICANT LABORATORY DATA:  WBC 6.8, hemoglobin 14.3, platelet 200.  Sodium 
135, potassium 4.0, BUN 8, creatinine 0.71, calcium 8.5, hemoglobin A1c 11.9.  
Blood culture negative.  Culture from the abscess grew Staph aureus.

 

DISCHARGE MEDICATIONS:  Tylenol #3 one or two tablets p.o. q.6 hourly p.r.n. 
for pain, Keflex 500 mg p.o. t.i.d., Florastor 250 mg p.o. daily.  The patient 
will continue antibiotic therapy and probiotic therapy for 15 days.  Glucotrol 
5 mg p.o. daily, metformin 850 mg p.o. b.i.d.

 

CONTRAINDICATIONS:  None.

 

CODE STATUS:  FULL CODE.

 

INPATIENT CONSULTANT:  Dr. Paredes was consulted knee prepatellar bursitis.

 

TEST RESULTS PENDING ON DISCHARGE:  None.

 

ALLERGIES:  No known drug allergy.

 

DISCHARGE PLAN:  Post hospital, patient is advised to follow up with Dr. Arnold in 7 days.  The patient is advised to follow with primary care 
physician in 1 week.

 

HOSPITAL COURSE:  A 42-year-old male with the above mentioned medical problem 
who was admitted by Dr. Weiner.  Please see his H\T\P for further details.  The 
patient was having right knee swelling, erythema, and tenderness.  Patient was 
admitted for right knee cellulitis.  We suspect prepatellar bursitis.  Initially
, patient was given antibiotic trial, but he did not have any significant 
improvement.  Orthopedics was consulted and they agreed with I\T\D, which was 
done while in hospital.  Subsequently, culture grew Staph aureus.

 

After surgery, patient was getting wound care.  While in hospital, he received 
vancomycin and Rocephin.  On discharge, we changed to Keflex for another 15 
days.  The patient's wife learned how to do the dressing at home and they are 
comfortable doing that at home.  His pain is under control.  As long as 
Orthopedic is okay, then we will consider discharging him home today.

 

This patient has diabetes that is why we started glipizide and metformin.  
Dietary instruction given.  Diabetic diet educated.

 

The patient is seen and examined at bedside today.  All review of systems 
reviewed with him and negative.

 

PHYSICAL EXAMINATION:

VITAL SIGNS:  Currently, temperature 97.6, pulse 77, respiratory rate 18, 
saturation 96%, blood pressure 113/83.  Weight 223 pounds.

GENERAL:  The patient is currently alert, awake, no acute distress.

HEAD:  Normocephalic, atraumatic.

LUNGS:  Clear to auscultation without any rhonchi or rales.

CARDIAC:  S1, S2 regular without any murmur.

ABDOMEN:  Soft, benign.

EXTREMITIES:  No edema.

NEUROLOGIC:  Nonfocal examination.

 

Right knee is covered with a dressing.  All new medication prescriptions sent 
to his pharmacy.  Patient is medically stable for discharge today.



Total time spent on discharge day 31 minutes

 

 

MTDD

## 2017-11-03 ENCOUNTER — HOSPITAL ENCOUNTER (EMERGENCY)
Dept: HOSPITAL 92 - ERS | Age: 42
Discharge: HOME | End: 2017-11-03
Payer: SELF-PAY

## 2017-11-03 DIAGNOSIS — E11.9: ICD-10-CM

## 2017-11-03 DIAGNOSIS — Z48.817: Primary | ICD-10-CM

## 2017-11-03 PROCEDURE — 99282 EMERGENCY DEPT VISIT SF MDM: CPT

## 2019-06-10 ENCOUNTER — HOSPITAL ENCOUNTER (EMERGENCY)
Dept: HOSPITAL 92 - ERS | Age: 44
Discharge: HOME | End: 2019-06-10
Payer: COMMERCIAL

## 2019-06-10 DIAGNOSIS — L02.212: Primary | ICD-10-CM

## 2019-06-10 DIAGNOSIS — E11.9: ICD-10-CM

## 2019-06-10 DIAGNOSIS — L03.312: ICD-10-CM

## 2019-06-10 PROCEDURE — 90471 IMMUNIZATION ADMIN: CPT

## 2019-06-10 PROCEDURE — 90715 TDAP VACCINE 7 YRS/> IM: CPT

## 2021-06-13 ENCOUNTER — HOSPITAL ENCOUNTER (EMERGENCY)
Dept: HOSPITAL 92 - ERS | Age: 46
Discharge: HOME | End: 2021-06-13
Payer: SELF-PAY

## 2021-06-13 DIAGNOSIS — J34.0: Primary | ICD-10-CM

## 2021-06-13 DIAGNOSIS — E11.9: ICD-10-CM

## 2021-06-13 PROCEDURE — 10060 I&D ABSCESS SIMPLE/SINGLE: CPT

## 2021-10-20 ENCOUNTER — HOSPITAL ENCOUNTER (EMERGENCY)
Dept: HOSPITAL 92 - ERS | Age: 46
Discharge: HOME | End: 2021-10-20
Payer: SELF-PAY

## 2021-10-20 DIAGNOSIS — S62.633A: Primary | ICD-10-CM
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
## 2021-10-20 DIAGNOSIS — I10: ICD-10-CM

## 2021-10-20 DIAGNOSIS — E11.9: ICD-10-CM

## 2021-10-20 DIAGNOSIS — S62.635A: ICD-10-CM

## 2021-10-20 PROCEDURE — 96365 THER/PROPH/DIAG IV INF INIT: CPT

## 2021-10-20 PROCEDURE — 96375 TX/PRO/DX INJ NEW DRUG ADDON: CPT
